# Patient Record
Sex: FEMALE | Race: OTHER
[De-identification: names, ages, dates, MRNs, and addresses within clinical notes are randomized per-mention and may not be internally consistent; named-entity substitution may affect disease eponyms.]

---

## 2020-10-21 PROBLEM — Z00.00 ENCOUNTER FOR PREVENTIVE HEALTH EXAMINATION: Status: ACTIVE | Noted: 2020-10-21

## 2020-10-23 ENCOUNTER — APPOINTMENT (OUTPATIENT)
Dept: HEMATOLOGY ONCOLOGY | Facility: CLINIC | Age: 55
End: 2020-10-23
Payer: COMMERCIAL

## 2020-10-23 VITALS
OXYGEN SATURATION: 97 % | SYSTOLIC BLOOD PRESSURE: 138 MMHG | DIASTOLIC BLOOD PRESSURE: 85 MMHG | BODY MASS INDEX: 32.43 KG/M2 | RESPIRATION RATE: 18 BRPM | TEMPERATURE: 97.9 F | HEART RATE: 70 BPM | WEIGHT: 140.13 LBS | HEIGHT: 55 IN

## 2020-10-23 DIAGNOSIS — Z87.898 PERSONAL HISTORY OF OTHER SPECIFIED CONDITIONS: ICD-10-CM

## 2020-10-23 DIAGNOSIS — M79.604 PAIN IN RIGHT LEG: ICD-10-CM

## 2020-10-23 DIAGNOSIS — Z85.9 PERSONAL HISTORY OF MALIGNANT NEOPLASM, UNSPECIFIED: ICD-10-CM

## 2020-10-23 DIAGNOSIS — Z80.0 FAMILY HISTORY OF MALIGNANT NEOPLASM OF DIGESTIVE ORGANS: ICD-10-CM

## 2020-10-23 DIAGNOSIS — M81.0 AGE-RELATED OSTEOPOROSIS W/OUT CURRENT PATHOLOGICAL FRACTURE: ICD-10-CM

## 2020-10-23 DIAGNOSIS — Z17.0 ESTROGEN RECEPTOR POSITIVE STATUS [ER+]: ICD-10-CM

## 2020-10-23 DIAGNOSIS — Z78.9 OTHER SPECIFIED HEALTH STATUS: ICD-10-CM

## 2020-10-23 DIAGNOSIS — Z80.1 FAMILY HISTORY OF MALIGNANT NEOPLASM OF TRACHEA, BRONCHUS AND LUNG: ICD-10-CM

## 2020-10-23 DIAGNOSIS — Z82.49 FAMILY HISTORY OF ISCHEMIC HEART DISEASE AND OTHER DISEASES OF THE CIRCULATORY SYSTEM: ICD-10-CM

## 2020-10-23 DIAGNOSIS — I10 ESSENTIAL (PRIMARY) HYPERTENSION: ICD-10-CM

## 2020-10-23 DIAGNOSIS — Z83.3 FAMILY HISTORY OF DIABETES MELLITUS: ICD-10-CM

## 2020-10-23 DIAGNOSIS — Z71.82 EXERCISE COUNSELING: ICD-10-CM

## 2020-10-23 DIAGNOSIS — M25.511 PAIN IN RIGHT SHOULDER: ICD-10-CM

## 2020-10-23 DIAGNOSIS — N93.9 ABNORMAL UTERINE AND VAGINAL BLEEDING, UNSPECIFIED: ICD-10-CM

## 2020-10-23 DIAGNOSIS — Z85.3 PERSONAL HISTORY OF MALIGNANT NEOPLASM OF BREAST: ICD-10-CM

## 2020-10-23 PROCEDURE — 99072 ADDL SUPL MATRL&STAF TM PHE: CPT

## 2020-10-23 PROCEDURE — 99215 OFFICE O/P EST HI 40 MIN: CPT

## 2020-10-23 RX ORDER — VITAMIN B COMPLEX
CAPSULE ORAL
Refills: 0 | Status: ACTIVE | COMMUNITY

## 2020-10-23 RX ORDER — ADHESIVE TAPE 3"X 2.3 YD
50 MCG TAPE, NON-MEDICATED TOPICAL
Refills: 0 | Status: ACTIVE | COMMUNITY

## 2020-10-23 RX ORDER — HYDROCHLOROTHIAZIDE 12.5 MG/1
12.5 TABLET ORAL
Refills: 0 | Status: ACTIVE | COMMUNITY

## 2020-10-23 NOTE — REASON FOR VISIT
[Follow-Up Visit] : a follow-up [FreeTextEntry2] : History of ER + left breast cancer diagnosed in 2010

## 2020-10-23 NOTE — PHYSICAL EXAM
[Normal] : RRR, normal S1S2, no murmurs, rubs, gallops [Restricted in physically strenuous activity but ambulatory and able to carry out work of a light or sedentary nature] : Status 1- Restricted in physically strenuous activity but ambulatory and able to carry out work of a light or sedentary nature, e.g., light house work, office work [de-identified] : no masses or adenopathy bilaterally , no skin lesions over breast  [de-identified] : 1 cm polypoid lesion left upper back

## 2020-10-23 NOTE — REVIEW OF SYSTEMS
[Negative] : Heme/Lymph [FreeTextEntry2] : +hot flash, especially in the warm weather [de-identified] : reports lump on back

## 2020-10-23 NOTE — HISTORY OF PRESENT ILLNESS
[de-identified] : THis is a 54 y/o woman diagnosed in 2010 with breast cancer at which time she was premenopausal. \par At time of lumpectomy she was found to have a IDC 2 foci of disease one measuring 5 mm and the other 4 mm. \par Both were strongly er pos at 90% and pr positive 70% and her2 negative. There was also LCIS present. \par All ndoes were engative. T1b No. \par Oncotype was 15 low risk so she did not receive chemotherapy. \par Patient received radiation  post lumpectomy\par Then started on tamoxifen in april 2011 , completed 5 years \par In may 2017 she was switched to ai and developed abnormal bleeding, despite not having a period for severeal years her nhormonal levels were premenopsual range so she was switched back to tamxoifen in dec 2017. \par Work up for the bleeding was negative \par She has had no further bleeding and continues on the tamoxifen \par \par Patient has hx of iron def for which has seen GI , last colnosopy was in 2015. \par Patient follows with dr cortez breast surgery  april 2020 \par osteopenia in dexa sept 2019 \par \par genetic testing was negative at dx \par  [de-identified] : \par 10/23/2020\par -Continues to take Tamoxifen, she is in her 10th year of Tamoxifen, she will complete 10 years in April of 2021.  She continues to take daily aspirin.\par -Continues to have fatigue and leg pains.  No leg swelling or redness of the legs.  She describes it as stiffness in joints and muscles of legs after sleep and sitting for a prolonged period of time. \par -weight gain of 5 pounds since COVID. \par - Was prescribed HCTZ by PCP (Dr Hill at [facility]) for HTN, but she discontinued taking it because she was feeling well\par Appetite is good.\par \par Last Mammogram: 07/2020 at [facility]\par Last Bone Density: 09/2019 at [facility]\par Last Colonoscopy: 5 years ago, is due now, sister dx at age 57 with in the last year. Dr Fitzgerald suggested colonoscopy q5 years b/c of new family hx\par Last GYN: due in 2019\par \par \par

## 2020-10-23 NOTE — ASSESSMENT
[FreeTextEntry1] : This is a 53 y/o woman with hx of IDC T1b No er positive her2 negative oncotype 15 , no chemotherapy \par completing 10 years of tamoxifen in march 2020 . Genetics negative \par \par 1)   ER+ breast ca\par doing well VERONICA \par check ca markers today\par mammogram up todate july 2020 \par Continue Tamoxifen  and asa until march 2020 (complete 10 years) \par optinos after this include : stop versus switch to evista  for bone density \par \par 2) Vitamin D deficiency\par continue vit D supplement\par check levels today\par \par 3) Vitamin B12 deficiency\par check levels today\par continue supplement, adjust if necessary\par \par 4) Bone density\par encouraged exercise\par DEXA next year\par continue Vit D\par \par 5) Polypoid skin lesion\par F/U with derm\par \par 6) Recent dx HTN\par resume HCTZ per PCP\par BP slightly elevated at today's visit\par \par 7) health maintannce \par up todate on gyn \par advise follow up with gi given family hx of colno cancer \par \par follow up in 6 months \par dw patient at length \par

## 2020-10-28 LAB
25(OH)D3 SERPL-MCNC: 32 NG/ML
ALBUMIN SERPL ELPH-MCNC: 4.3 G/DL
ALP BLD-CCNC: 96 U/L
ALT SERPL-CCNC: 9 U/L
ANION GAP SERPL CALC-SCNC: 16 MMOL/L
AST SERPL-CCNC: 17 U/L
BASOPHILS # BLD AUTO: 0.03 K/UL
BASOPHILS NFR BLD AUTO: 0.5 %
BILIRUB SERPL-MCNC: 0.3 MG/DL
BUN SERPL-MCNC: 14 MG/DL
CALCIUM SERPL-MCNC: 9.4 MG/DL
CANCER AG15-3 SERPL-ACNC: 28.9 U/ML
CEA SERPL-MCNC: 2.1 NG/ML
CHLORIDE SERPL-SCNC: 104 MMOL/L
CO2 SERPL-SCNC: 25 MMOL/L
CREAT SERPL-MCNC: 0.68 MG/DL
EOSINOPHIL # BLD AUTO: 0.06 K/UL
EOSINOPHIL NFR BLD AUTO: 1 %
GLUCOSE SERPL-MCNC: 56 MG/DL
HCT VFR BLD CALC: 44.7 %
HGB BLD-MCNC: 13 G/DL
IMM GRANULOCYTES NFR BLD AUTO: 0.2 %
LYMPHOCYTES # BLD AUTO: 2.14 K/UL
LYMPHOCYTES NFR BLD AUTO: 35 %
MAN DIFF?: NORMAL
MCHC RBC-ENTMCNC: 26.6 PG
MCHC RBC-ENTMCNC: 29.1 GM/DL
MCV RBC AUTO: 91.4 FL
MONOCYTES # BLD AUTO: 0.44 K/UL
MONOCYTES NFR BLD AUTO: 7.2 %
NEUTROPHILS # BLD AUTO: 3.44 K/UL
NEUTROPHILS NFR BLD AUTO: 56.1 %
PLATELET # BLD AUTO: 270 K/UL
POTASSIUM SERPL-SCNC: 4.8 MMOL/L
PROT SERPL-MCNC: 6.9 G/DL
RBC # BLD: 4.89 M/UL
RBC # FLD: 14 %
SODIUM SERPL-SCNC: 145 MMOL/L
VIT B12 USB SERPL-MCNC: 936 PG/ML
WBC # FLD AUTO: 6.12 K/UL

## 2021-04-27 ENCOUNTER — APPOINTMENT (OUTPATIENT)
Dept: HEMATOLOGY ONCOLOGY | Facility: CLINIC | Age: 56
End: 2021-04-27
Payer: COMMERCIAL

## 2021-04-27 VITALS
SYSTOLIC BLOOD PRESSURE: 136 MMHG | OXYGEN SATURATION: 100 % | DIASTOLIC BLOOD PRESSURE: 81 MMHG | WEIGHT: 141 LBS | TEMPERATURE: 98 F | RESPIRATION RATE: 15 BRPM | HEART RATE: 79 BPM | BODY MASS INDEX: 43.03 KG/M2

## 2021-04-27 PROCEDURE — 99072 ADDL SUPL MATRL&STAF TM PHE: CPT

## 2021-04-27 PROCEDURE — 99214 OFFICE O/P EST MOD 30 MIN: CPT

## 2021-04-27 NOTE — REASON FOR VISIT
[Follow-Up Visit] : a follow-up [FreeTextEntry2] : History of ER + left breast cancer, no new complaints

## 2021-04-27 NOTE — PHYSICAL EXAM
[Restricted in physically strenuous activity but ambulatory and able to carry out work of a light or sedentary nature] : Status 1- Restricted in physically strenuous activity but ambulatory and able to carry out work of a light or sedentary nature, e.g., light house work, office work [Normal] : affect appropriate [de-identified] : no masses or adenopathy bilaterally , no skin lesions over breast

## 2021-04-27 NOTE — HISTORY OF PRESENT ILLNESS
[de-identified] : THis is a 56 y/o woman diagnosed in 2010 with breast cancer at which time she was premenopausal. \par At time of lumpectomy she was found to have a IDC 2 foci of disease one measuring 5 mm and the other 4 mm. \par Both were strongly er pos at 90% and pr positive 70% and her2 negative. There was also LCIS present. \par All ndoes were engative. T1b No. \par Oncotype was 15 low risk so she did not receive chemotherapy. \par Patient received radiation  post lumpectomy\par Then started on tamoxifen in april 2011 , completed 5 years \par In may 2017 she was switched to ai and developed abnormal bleeding, despite not having a period for severeal years her nhormonal levels were premenopsual range so she was switched back to tamxoifen in dec 2017. \par Work up for the bleeding was negative \par She has had no further bleeding and continues on the tamoxifen \par \par Patient has hx of iron def for which has seen GI , last colnosopy was in 2015. \par Patient follows with dr cortez breast surgery  april 2020 \par osteopenia in dexa sept 2019 \par \par genetic testing was negative at dx \par  [de-identified] : \par 10 years tamoxifen completed this month \par HCTZ for bp \par occ headache root canal today \par no sob or cough \par leg cramps continue \par mammogram july 2021  diego hollis is surgeon she will see\par colonoscopy made appt \par got vaccine \par gyn done \par bone density 9/2019 , due for dexa in 2021 sept

## 2021-04-27 NOTE — ASSESSMENT
[FreeTextEntry1] : This is a 53 y/o woman with hx of IDC T1b No er positive her2 negative oncotype 15 , no chemotherapy \par completing 10 years of tamoxifen in march 2020 . Genetics negative \par \par 1)   ER+ breast ca\par doing well VERONICA \par markers negative repeat today \par mammogram up todate july 2020 \par repeat mammo july 2021 \par completed 10 years of tamoxifen this month , dc tamoxifen \par follow up repeat dexa if worse consider evista \par \par \par \par 2) Vitamin D deficiency\par continue vit D supplement\par check levels today\par \par 3) Vitamin B12 deficiency\par cont b12 \par check levels today\par \par 4) Bone density\par encouraged exercise\par DEXA due \par continue Vit D\par \par 5) Polypoid skin lesion\par F/U with derm\par \par 6) Recent dx HTN\par imrpoved \par \par 7) health maintannce \par up todate on gyn \par colonoscopy negative \par \par follow up in 6 months \par dw patient at length \par

## 2021-04-27 NOTE — REVIEW OF SYSTEMS
[Fever] : no fever [Chills] : no chills [Night Sweats] : no night sweats [Fatigue] : fatigue [Recent Change In Weight] : ~T no recent weight change [Negative] : Integumentary [FreeTextEntry2] : occ hot flash

## 2021-05-02 LAB
25(OH)D3 SERPL-MCNC: 28.5 NG/ML
CANCER AG15-3 SERPL-ACNC: 30.9 U/ML
CEA SERPL-MCNC: 2.8 NG/ML
FOLATE SERPL-MCNC: 6.8 NG/ML
VIT B12 SERPL-MCNC: 1016 PG/ML

## 2021-05-13 ENCOUNTER — NON-APPOINTMENT (OUTPATIENT)
Age: 56
End: 2021-05-13

## 2021-11-30 ENCOUNTER — APPOINTMENT (OUTPATIENT)
Dept: HEMATOLOGY ONCOLOGY | Facility: CLINIC | Age: 56
End: 2021-11-30
Payer: COMMERCIAL

## 2021-11-30 VITALS
WEIGHT: 149 LBS | SYSTOLIC BLOOD PRESSURE: 137 MMHG | DIASTOLIC BLOOD PRESSURE: 76 MMHG | OXYGEN SATURATION: 100 % | HEART RATE: 79 BPM | HEIGHT: 55 IN | TEMPERATURE: 98.3 F | BODY MASS INDEX: 34.48 KG/M2 | RESPIRATION RATE: 18 BRPM

## 2021-11-30 PROCEDURE — 99213 OFFICE O/P EST LOW 20 MIN: CPT

## 2021-11-30 RX ORDER — ASPIRIN 81 MG
81 TABLET, DELAYED RELEASE (ENTERIC COATED) ORAL
Refills: 0 | Status: DISCONTINUED | COMMUNITY
End: 2021-11-30

## 2021-11-30 RX ORDER — TAMOXIFEN CITRATE 20 MG/1
20 TABLET, FILM COATED ORAL
Refills: 0 | Status: DISCONTINUED | COMMUNITY
End: 2021-11-30

## 2021-11-30 NOTE — REVIEW OF SYSTEMS
[Joint Stiffness] : joint stiffness [Hot Flashes] : hot flashes [Negative] : Heme/Lymph [Fever] : no fever [Chills] : no chills [Night Sweats] : no night sweats [Fatigue] : no fatigue [Recent Change In Weight] : ~T no recent weight change [Joint Pain] : no joint pain [FreeTextEntry9] : see interval hx

## 2021-11-30 NOTE — PHYSICAL EXAM
[Restricted in physically strenuous activity but ambulatory and able to carry out work of a light or sedentary nature] : Status 1- Restricted in physically strenuous activity but ambulatory and able to carry out work of a light or sedentary nature, e.g., light house work, office work [Normal] : affect appropriate [de-identified] : b/L DP/PT pulses +2 [de-identified] : b/l breast asymmetrical and dense . no masses or adenopathy bilaterally , no skin lesions over breast . no b/l axilla lymphadenopathy  [de-identified] : no point tenderness upon palpation to lumbar aspect.

## 2021-11-30 NOTE — ASSESSMENT
[FreeTextEntry1] : This is a 57 y/o woman with hx of IDC T1b No er positive her2 negative oncotype 15 , no chemotherapy \par completing 10 years of tamoxifen in march 2020 . Genetics negative. \par \par 1)   ER+ breast ca\par doing well VERONICA \par markers negative repeat today \par mammogram up todate july 2020 \par repeat mammo and f/u with Dr. Reid july 2021 \par completed 10 years of tamoxifen this month , d/c tamoxifen \par \par 2) Vitamin D deficiency\par continue vit D supplement\par check levels today\par \par 3) Vitamin B12 deficiency\par cont b12 \par check levels today\par \par 4) Bone density\par encouraged exercise\par DEXA due 09/2021- osteopenia \par continue Vit D\par \par 5) Polypoid skin lesion\par F/U with derm\par \par 6) Recent dx HTN\par on HCTZ \par follow up with PCP \par \par 7) health maintenance \par up to date on gyn \par colonoscopy due\par \par follow up in 6 months \par All questions answered to her satisfaction\par dw patient at length \par

## 2021-11-30 NOTE — HISTORY OF PRESENT ILLNESS
[de-identified] : THis is a 57 y/o woman diagnosed in 2010 with breast cancer at which time she was premenopausal. \par At time of lumpectomy she was found to have a IDC 2 foci of disease one measuring 5 mm and the other 4 mm. \par Both were strongly er pos at 90% and pr positive 70% and her2 negative. There was also LCIS present. \par All ndoes were engative. T1b No. \par Oncotype was 15 low risk so she did not receive chemotherapy. \par Patient received radiation  post lumpectomy\par Then started on tamoxifen in april 2011 , completed 5 years \par In may 2017 she was switched to ai and developed abnormal bleeding, despite not having a period for severeal years her nhormonal levels were premenopsual range so she was switched back to tamxoifen in dec 2017. \par Work up for the bleeding was negative \par She has had no further bleeding and continues on the tamoxifen \par \par Patient has hx of iron def for which has seen GI , last colnosopy was in 2015. \par Patient follows with dr cortez breast surgery  april 2020 \par osteopenia in dexa sept 2019 \par \par genetic testing was negative at dx \par  [de-identified] : still working- \par motivated to exercise more \par lower back pain improves with stretching and exercise \par not taking HCTZ for BP on consistent basis \par no neurological symptoms, including dizziness, headache, etc. \par mild leg cramping \par saw Dr. Reid in 07/2021- up to date mammo/us \par colonoscopy due/ gyn up to date \par got vaccine \par gyn done \par up to date dexa. taking 4000iu Vit D daily.

## 2021-12-01 ENCOUNTER — NON-APPOINTMENT (OUTPATIENT)
Age: 56
End: 2021-12-01

## 2021-12-01 LAB
25(OH)D3 SERPL-MCNC: 30.8 NG/ML
CANCER AG15-3 SERPL-ACNC: 27.3 U/ML
CEA SERPL-MCNC: 2 NG/ML
FERRITIN SERPL-MCNC: 27 NG/ML
FOLATE SERPL-MCNC: 11.3 NG/ML
VIT B12 SERPL-MCNC: 872 PG/ML

## 2022-05-27 ENCOUNTER — APPOINTMENT (OUTPATIENT)
Dept: HEMATOLOGY ONCOLOGY | Facility: CLINIC | Age: 57
End: 2022-05-27

## 2022-05-27 ENCOUNTER — APPOINTMENT (OUTPATIENT)
Dept: HEMATOLOGY ONCOLOGY | Facility: CLINIC | Age: 57
End: 2022-05-27
Payer: COMMERCIAL

## 2022-05-27 VITALS
OXYGEN SATURATION: 98 % | WEIGHT: 146 LBS | HEART RATE: 72 BPM | SYSTOLIC BLOOD PRESSURE: 136 MMHG | DIASTOLIC BLOOD PRESSURE: 83 MMHG | BODY MASS INDEX: 29.43 KG/M2 | HEIGHT: 59 IN | TEMPERATURE: 97.9 F | RESPIRATION RATE: 18 BRPM

## 2022-05-27 DIAGNOSIS — E53.8 DEFICIENCY OF OTHER SPECIFIED B GROUP VITAMINS: ICD-10-CM

## 2022-05-27 DIAGNOSIS — E55.9 VITAMIN D DEFICIENCY, UNSPECIFIED: ICD-10-CM

## 2022-05-27 DIAGNOSIS — C50.919 MALIGNANT NEOPLASM OF UNSPECIFIED SITE OF UNSPECIFIED FEMALE BREAST: ICD-10-CM

## 2022-05-27 DIAGNOSIS — M85.80 OTHER SPECIFIED DISORDERS OF BONE DENSITY AND STRUCTURE, UNSPECIFIED SITE: ICD-10-CM

## 2022-05-27 DIAGNOSIS — E61.1 IRON DEFICIENCY: ICD-10-CM

## 2022-05-27 PROCEDURE — 99214 OFFICE O/P EST MOD 30 MIN: CPT

## 2022-05-30 LAB
25(OH)D3 SERPL-MCNC: 27.7 NG/ML
CANCER AG15-3 SERPL-ACNC: 28.7 U/ML
CEA SERPL-MCNC: 2.5 NG/ML
FERRITIN SERPL-MCNC: 39 NG/ML
FOLATE SERPL-MCNC: 12.6 NG/ML
VIT B12 SERPL-MCNC: 1460 PG/ML

## 2022-06-01 ENCOUNTER — NON-APPOINTMENT (OUTPATIENT)
Age: 57
End: 2022-06-01

## 2022-06-01 PROBLEM — C50.919 MALIGNANT NEOPLASM OF BREAST: Status: ACTIVE | Noted: 2020-10-23

## 2022-06-01 PROBLEM — E55.9 VITAMIN D DEFICIENCY: Status: ACTIVE | Noted: 2020-10-23

## 2022-06-01 PROBLEM — M85.80 OSTEOPENIA, UNSPECIFIED LOCATION: Status: ACTIVE | Noted: 2020-10-23

## 2022-06-01 PROBLEM — E61.1 IRON DEFICIENCY: Status: ACTIVE | Noted: 2020-10-23

## 2022-06-01 PROBLEM — E53.8 VITAMIN B12 DEFICIENCY: Status: ACTIVE | Noted: 2020-10-23

## 2022-06-01 NOTE — HISTORY OF PRESENT ILLNESS
[de-identified] : THis is a 56 y/o woman diagnosed in 2010 with breast cancer at which time she was premenopausal. \par At time of lumpectomy she was found to have a IDC 2 foci of disease one measuring 5 mm and the other 4 mm. \par Both were strongly er pos at 90% and pr positive 70% and her2 negative. There was also LCIS present. \par All ndoes were engative. T1b No. \par Oncotype was 15 low risk so she did not receive chemotherapy. \par Patient received radiation  post lumpectomy\par Then started on tamoxifen in april 2011 , completed 5 years \par In may 2017 she was switched to ai and developed abnormal bleeding, despite not having a period for severeal years her nhormonal levels were premenopsual range so she was switched back to tamxoifen in dec 2017. \par Work up for the bleeding was negative \par She has had no further bleeding and continues on the tamoxifen \par \par Patient has hx of iron def for which has seen GI , last colnosopy was in 2015. \par Patient follows with dr cortez breast surgery  april 2020 \par osteopenia in dexa sept 2019 \par \par genetic testing was negative at dx \par  [de-identified] : feeling well overall \par diarrhea abd pain \par saw dr de leon \par ultrasound saw gallbladder  but haley did not think was gb\par so did colonsocopy and was negative  for malginancy , also did egd \par diarrhea is better , , no pain now\par on pepcid \par having joint pains and legs , knees hips , stiff \par had lyme disease long time ago \par had physical last month , \par neurologist dr dobson for ? nerve pain \par mammogram - july 2022 dr cortez , ultrasound \par osteopenia sept 2022 \par jan 2022 covid  positive

## 2022-06-01 NOTE — HISTORY OF PRESENT ILLNESS
[de-identified] : THis is a 54 y/o woman diagnosed in 2010 with breast cancer at which time she was premenopausal. \par At time of lumpectomy she was found to have a IDC 2 foci of disease one measuring 5 mm and the other 4 mm. \par Both were strongly er pos at 90% and pr positive 70% and her2 negative. There was also LCIS present. \par All ndoes were engative. T1b No. \par Oncotype was 15 low risk so she did not receive chemotherapy. \par Patient received radiation  post lumpectomy\par Then started on tamoxifen in april 2011 , completed 5 years \par In may 2017 she was switched to ai and developed abnormal bleeding, despite not having a period for severeal years her nhormonal levels were premenopsual range so she was switched back to tamxoifen in dec 2017. \par Work up for the bleeding was negative \par She has had no further bleeding and continues on the tamoxifen \par \par Patient has hx of iron def for which has seen GI , last colnosopy was in 2015. \par Patient follows with dr cortez breast surgery  april 2020 \par osteopenia in dexa sept 2019 \par \par genetic testing was negative at dx \par  [de-identified] : feeling well overall \par diarrhea abd pain \par saw dr de leon \par ultrasound saw gallbladder  but haley did not think was gb\par so did colonsocopy and was negative  for malginancy , also did egd \par diarrhea is better , , no pain now\par on pepcid \par having joint pains and legs , knees hips , stiff \par had lyme disease long time ago \par had physical last month , \par neurologist dr dobson for ? nerve pain \par mammogram - july 2022 dr cortez , ultrasound \par osteopenia sept 2022 \par jan 2022 covid  positive

## 2022-06-01 NOTE — ASSESSMENT
[FreeTextEntry1] : This is a 53 y/o woman with hx of IDC T1b No er positive her2 negative oncotype 15 , no chemotherapy \par completing 10 years of tamoxifen in march 2020 . Genetics negative \par \par 1)   ER+ breast ca\par doing well VERONICA \par markers negative repeat today \par mammo due july 2022 \par completed 10 years of tamoxifen this month , dc tamoxifen  april 2022 \par \par 2) Vitamin D deficiency\par continue vit D supplement\par check levels today\par \par 3) Vitamin B12 deficiency\par cont b12 \par check levels today\par \par 4) Bone density\par encouraged exercise\par DEXA done sept 2021 - low bone density , dw \par continue Vit D\par \par 5) Polypoid skin lesion\par F/U with derm\par \par 6) Recent dx HTN\par imrpoved \par \par 7) health maintannce \par up todate on gyn \par colonoscopy negative done recently \par \par 8) joint pains \par advise follow up with pcp and possible rheumatology \par \par 9) gi symptoms \par resolved \par s/p extensive gi workup \par \par follow up in 6 months \par dw patient at length \par

## 2024-03-10 PROBLEM — Z71.82 EXERCISE COUNSELING: Status: RESOLVED | Noted: 2020-10-23 | Resolved: 2024-03-10
